# Patient Record
Sex: FEMALE | Race: WHITE | Employment: UNEMPLOYED | URBAN - METROPOLITAN AREA
[De-identification: names, ages, dates, MRNs, and addresses within clinical notes are randomized per-mention and may not be internally consistent; named-entity substitution may affect disease eponyms.]

---

## 2022-11-17 ENCOUNTER — HOSPITAL ENCOUNTER (EMERGENCY)
Dept: GENERAL RADIOLOGY | Age: 32
Discharge: HOME | End: 2022-11-20

## 2022-11-17 ENCOUNTER — HOSPITAL ENCOUNTER (EMERGENCY)
Age: 32
Discharge: HOME | End: 2022-11-17
Attending: EMERGENCY MEDICINE

## 2022-11-17 VITALS
RESPIRATION RATE: 17 BRPM | DIASTOLIC BLOOD PRESSURE: 86 MMHG | HEART RATE: 88 BPM | TEMPERATURE: 98.1 F | WEIGHT: 293 LBS | OXYGEN SATURATION: 100 % | HEIGHT: 63 IN | SYSTOLIC BLOOD PRESSURE: 149 MMHG | BODY MASS INDEX: 51.91 KG/M2

## 2022-11-17 DIAGNOSIS — S90.32XA CONTUSION OF LEFT FOOT, INITIAL ENCOUNTER: Primary | ICD-10-CM

## 2022-11-17 PROCEDURE — 73630 X-RAY EXAM OF FOOT: CPT

## 2022-11-17 PROCEDURE — 99283 EMERGENCY DEPT VISIT LOW MDM: CPT

## 2022-11-17 ASSESSMENT — PAIN DESCRIPTION - LOCATION: LOCATION: FOOT

## 2022-11-17 ASSESSMENT — PAIN DESCRIPTION - DESCRIPTORS: DESCRIPTORS: ACHING

## 2022-11-17 ASSESSMENT — PAIN DESCRIPTION - ORIENTATION: ORIENTATION: LEFT

## 2022-11-17 ASSESSMENT — PAIN - FUNCTIONAL ASSESSMENT: PAIN_FUNCTIONAL_ASSESSMENT: 0-10

## 2022-11-17 ASSESSMENT — PAIN SCALES - GENERAL: PAINLEVEL_OUTOF10: 9

## 2022-11-17 NOTE — DISCHARGE INSTRUCTIONS
We would love to help you get a primary care doctor for follow-up after your emergency department visit. Please call [phone] between 7AM - 6PM Monday to Friday. A care navigator will be able to assist you with setting up a doctor close to your home.

## 2022-11-17 NOTE — ED TRIAGE NOTES
Pt arrives via pov ambulatory c/o left foot pain after dropping a piece of furniture on it. Pt reports bruising at first and small amount of swelling.

## 2022-11-17 NOTE — ED PROVIDER NOTES
[facility] Provider Note                   PCP:                None Provider               Age: 29 y.o. Sex: female       Jose E Peralta is a 29 y.o. female who presents to the Emergency Department with chief complaint of    Chief Complaint   Patient presents with    Foot Injury      Patient states that 2 days ago she dropped a piece of furniture on her left foot. Patient has pain and swelling in the area. Patient is able to bear weight with her foot but it does cause discomfort. Patient has no other injuries or complaints. The history is provided by the patient. All other systems reviewed and are negative. Review of Systems   Musculoskeletal:  Positive for arthralgias and myalgias. Skin:  Negative for wound. Neurological:  Negative for weakness and numbness. No past medical history on file. No past surgical history on file. No family history on file. Social History     Socioeconomic History    Marital status: Single        Allergies: Patient has no known allergies. Previous Medications    No medications on file        Vitals signs and nursing note reviewed. Patient Vitals for the past 4 hrs:   Temp Pulse Resp BP SpO2   11/17/22 0933 -- 88 -- (!) 149/86 --   11/17/22 0829 98.1 °F (36.7 °C) 84 17 (!) 155/116 100 %          Physical Exam  Vitals and nursing note reviewed. Constitutional:       Appearance: Normal appearance. Cardiovascular:      Pulses: Normal pulses. Musculoskeletal:         General: Swelling and tenderness present. No deformity. Normal range of motion. Comments: Mild tenderness and swelling to left, proximal dorsal aspect of foot without any erythema or ecchymosis. Compartments are soft. Skin:     General: Skin is warm and dry. Neurological:      General: No focal deficit present. Mental Status: She is alert and oriented to person, place, and time. Sensory: No sensory deficit. Motor: No weakness. Orders Placed This Encounter   Procedures    XR FOOT LEFT (MIN 3 VIEWS)    [employer] Crutches; Pair, Left Side Injury; Med (5'2\"-5'10\")    [employer] Post Op Shoe, Unisex - Left; LG (M9.5-12/F10.5-13)       Procedures        Results Include:    No results found for this or any previous visit (from the past 24 hour(s)). XR FOOT LEFT (MIN 3 VIEWS)   Final Result   No acute osseous abnormality. Dorsal forefoot soft tissue swelling. ED Course as of 11/17/22 0951   Thu Nov 17, 2022   8566 Patient's repeat blood pressure was 140 over 80s. I explained the results and plan and patient is comfortable with discharge. [CW]      ED Course User Index  [CW] Teresa Brown MD       MDM  Number of Diagnoses or Management Options  Contusion of left foot, initial encounter  Diagnosis management comments: Patient presents for evaluation of left foot pain after injury. Patient foot x-ray showed no fracture or dislocation. Patient is neurovascularly intact without any signs of compartment syndrome. Patient was given postop shoe and crutches. I recommended RICE. Patient was discharged home with primary care follow-up. Explanation: The diagnosis and plan as well as the results of any testing (if done) and treatments (if done) today in the emergency department were communicated to the patient and/or their family/caregiver (if present). The patient/caregiver verbalized understanding and compliance with the treatment plan and reasons to return immediately to the emergency department. All questions were answered at bedside      ICD-10-CM    1. Contusion of left foot, initial encounter  [id number] [id number] Discharge - Pending Orders Complete 11/17/2022 09:47:46 AM       Voice dictation software was used during the making of this note. This software is not perfect and grammatical and other typographical errors may be present.   This note has not been completely proofread for errors.      Cathi George MD  11/17/22 9884